# Patient Record
Sex: MALE | URBAN - METROPOLITAN AREA
[De-identification: names, ages, dates, MRNs, and addresses within clinical notes are randomized per-mention and may not be internally consistent; named-entity substitution may affect disease eponyms.]

---

## 2020-09-22 ENCOUNTER — WEB ENCOUNTER (OUTPATIENT)
Dept: URBAN - METROPOLITAN AREA CLINIC 35 | Facility: CLINIC | Age: 70
End: 2020-09-22

## 2020-09-24 PROBLEM — 428283002: Status: ACTIVE | Noted: 2020-09-24

## 2020-09-25 ENCOUNTER — OFFICE VISIT (OUTPATIENT)
Dept: URBAN - METROPOLITAN AREA CLINIC 37 | Facility: CLINIC | Age: 70
End: 2020-09-25

## 2020-09-25 VITALS — WEIGHT: 265 LBS | HEIGHT: 67 IN | BODY MASS INDEX: 41.59 KG/M2

## 2020-09-25 PROBLEM — 428375006: Status: ACTIVE | Noted: 2020-09-25

## 2020-09-25 PROBLEM — 443502000: Status: ACTIVE | Noted: 2020-09-24

## 2020-09-25 PROBLEM — 266569009: Status: ACTIVE | Noted: 2020-09-24

## 2020-09-25 PROBLEM — 267434003: Status: ACTIVE | Noted: 2020-09-24

## 2020-09-25 PROBLEM — 305058001: Status: ACTIVE | Noted: 2020-09-25

## 2020-09-25 PROBLEM — 451361000124102: Status: ACTIVE | Noted: 2020-09-24

## 2020-09-25 RX ORDER — SODIUM SULFATE, POTASSIUM SULFATE, MAGNESIUM SULFATE 17.5; 3.13; 1.6 G/ML; G/ML; G/ML
AS DIRECTED SOLUTION, CONCENTRATE ORAL ONCE
Qty: 1 KIT | Refills: 0 | OUTPATIENT
Start: 2020-09-25

## 2020-09-25 RX ORDER — ATORVASTATIN CALCIUM 40 MG/1
1 TABLET TABLET, FILM COATED ORAL ONCE A DAY
Status: ACTIVE | COMMUNITY

## 2020-09-25 RX ORDER — IBUPROFEN 200 MG/1
1 TABLET WITH FOOD OR MILK AS NEEDED TABLET, FILM COATED ORAL THREE TIMES A DAY
Status: ACTIVE | COMMUNITY

## 2020-09-25 RX ORDER — TADALAFIL 5 MG/1
TABLET, FILM COATED ORAL
Status: ACTIVE | COMMUNITY

## 2020-09-25 NOTE — HPI-MIGRATED HPI
Interim investigations : Labs done on: ->  * 02/28/2020,ordered by PCP:  - CBC: WBC: 7.2; RBC: 5.15; Hgb: 15.9; Hct: 46.7; Plt: 195;   Colorectal cancer screening : Family history of GI malignancy: -> Admits, mother had rectal cancer at age 71  y.o;   Colorectal cancer screening : Patient is here for -> high risk surveillance colonoscopy due to personal history of colonic polyps and family history of colorectal cancer ( mother);   Colorectal cancer screening : Last colonoscopy was -> 5-6 years ago, did not recall whether  polyps were removed , but was recommended by his Ins to repeat in 5 years ;   Colorectal cancer screening : Patients denies -> rectal bleeding, change in bowel habits, constipation, diarrhea, or abdominal pain;   Colorectal cancer screening : Current bowel movement patterns -> One soft BM daily;     Colorectal cancer screening : Denies dysphagia or odynophagia Currently taking anticoagulants/NSAIDs: ASA  81 mg daily and Ibuprofen PRN;   
EOMI; PERRL; no drainage or redness

## 2020-09-25 NOTE — EXAM-MIGRATED EXAMINATIONS
General Examination : via Telehealth ;     GENERAL APPEARANCE: - alert, pleasant, in no acute distress;

## 2020-09-27 ENCOUNTER — LAB OUTSIDE AN ENCOUNTER (OUTPATIENT)
Dept: URBAN - METROPOLITAN AREA CLINIC 37 | Facility: CLINIC | Age: 70
End: 2020-09-27

## 2020-11-23 ENCOUNTER — OFFICE VISIT (OUTPATIENT)
Dept: URBAN - METROPOLITAN AREA MEDICAL CENTER 10 | Facility: MEDICAL CENTER | Age: 70
End: 2020-11-23

## 2020-12-10 ENCOUNTER — OFFICE VISIT (OUTPATIENT)
Dept: URBAN - METROPOLITAN AREA CLINIC 37 | Facility: CLINIC | Age: 70
End: 2020-12-10

## 2020-12-21 ENCOUNTER — OFFICE VISIT (OUTPATIENT)
Dept: URBAN - METROPOLITAN AREA MEDICAL CENTER 10 | Facility: MEDICAL CENTER | Age: 70
End: 2020-12-21

## 2021-01-04 PROBLEM — 68496003: Status: ACTIVE | Noted: 2021-01-04

## 2021-01-04 PROBLEM — 721704005: Status: ACTIVE | Noted: 2021-01-04

## 2021-01-04 PROBLEM — 39772007: Status: ACTIVE | Noted: 2021-01-04

## 2021-01-04 PROBLEM — 724538004: Status: ACTIVE | Noted: 2021-01-04

## 2021-01-05 ENCOUNTER — DASHBOARD ENCOUNTERS (OUTPATIENT)
Age: 71
End: 2021-01-05

## 2021-01-07 ENCOUNTER — OFFICE VISIT (OUTPATIENT)
Dept: URBAN - METROPOLITAN AREA CLINIC 37 | Facility: CLINIC | Age: 71
End: 2021-01-07

## 2021-01-07 VITALS — HEIGHT: 67 IN

## 2021-01-07 RX ORDER — TADALAFIL 5 MG/1
TABLET, FILM COATED ORAL
Status: ACTIVE | COMMUNITY

## 2021-01-07 RX ORDER — ATORVASTATIN CALCIUM 40 MG/1
1 TABLET TABLET, FILM COATED ORAL ONCE A DAY
Status: ACTIVE | COMMUNITY

## 2021-01-07 RX ORDER — IBUPROFEN 200 MG/1
1 TABLET WITH FOOD OR MILK AS NEEDED TABLET, FILM COATED ORAL THREE TIMES A DAY
Status: ACTIVE | COMMUNITY

## 2021-01-07 NOTE — HPI-MIGRATED HPI
Post-op OV : After Colonoscopy on -> 12/21/2020, at which time two small polyps were detected and resected. Histology showed there was a tubular adenoma and a hyperplastic polyp. There was mild diverticulosis found in the sigmoid colon. Non-bleeding grade II internal and external hemorrhoids were found during retroflexion but not banded. Good quality bowel prep;   Post-op OV : Patient denies -> rectal bleeding, fever, nausea & vomiting since the procedure date;   Post-op OV : Patient -> denies any new changes in his/her health status since last OV. Current BM: soft BM daily;